# Patient Record
Sex: MALE | Race: WHITE | Employment: FULL TIME | URBAN - METROPOLITAN AREA
[De-identification: names, ages, dates, MRNs, and addresses within clinical notes are randomized per-mention and may not be internally consistent; named-entity substitution may affect disease eponyms.]

---

## 2019-11-13 ENCOUNTER — OFFICE VISIT (OUTPATIENT)
Dept: FAMILY MEDICINE | Facility: CLINIC | Age: 24
End: 2019-11-13
Payer: COMMERCIAL

## 2019-11-13 VITALS
HEIGHT: 71 IN | HEART RATE: 49 BPM | DIASTOLIC BLOOD PRESSURE: 89 MMHG | TEMPERATURE: 97.6 F | WEIGHT: 197 LBS | SYSTOLIC BLOOD PRESSURE: 136 MMHG | OXYGEN SATURATION: 98 % | BODY MASS INDEX: 27.58 KG/M2

## 2019-11-13 DIAGNOSIS — M54.50 ACUTE BILATERAL LOW BACK PAIN WITHOUT SCIATICA: Primary | ICD-10-CM

## 2019-11-13 DIAGNOSIS — M62.830 MUSCLE SPASM OF BACK: ICD-10-CM

## 2019-11-13 PROCEDURE — 99213 OFFICE O/P EST LOW 20 MIN: CPT | Performed by: INTERNAL MEDICINE

## 2019-11-13 RX ORDER — CYCLOBENZAPRINE HCL 5 MG
5 TABLET ORAL 2 TIMES DAILY PRN
Qty: 30 TABLET | Refills: 2 | Status: SHIPPED | OUTPATIENT
Start: 2019-11-13

## 2019-11-13 RX ORDER — LIDOCAINE 4 G/G
1 PATCH TOPICAL EVERY 24 HOURS
Qty: 30 PATCH | Refills: 3 | Status: SHIPPED | OUTPATIENT
Start: 2019-11-13

## 2019-11-13 ASSESSMENT — MIFFLIN-ST. JEOR: SCORE: 1904.37

## 2019-11-13 NOTE — PROGRESS NOTES
Micha Sanabria is a 23 year old male who presents to clinic today for the following health issues:    Back Pain       Duration: Since yesterday        Specific cause: turning/bending    Description:   Location of pain: low back bilateral  Character of pain: gnawing  Pain radiation:none  New numbness or weakness in legs, not attributed to pain:  no     Intensity: Currently 7-8/10    History:   Pain interferes with job: YES  History of back problems: no prior back problems  Any previous MRI or X-rays: None  Sees a specialist for back pain:  No  Therapies tried without relief: NSAIDs    Alleviating factors:   Improved by: None      Precipitating factors:  Worsened by: Walking          Current Medications:     Current Outpatient Medications   Medication Sig Dispense Refill     cyclobenzaprine (FLEXERIL) 5 MG tablet Take 1 tablet (5 mg) by mouth 2 times daily as needed for muscle spasms 30 tablet 2     Lidocaine (LIDOCARE) 4 % Patch Place 1 patch onto the skin every 24 hours 30 patch 3     order for DME Lumbar support brace 1 Units 3         Allergies:      Allergies   Allergen Reactions     Shellfish-Derived Products             Past Medical History:   No past medical history on file.      Past Surgical History:   No past surgical history on file.      Family Medical History:   No family history on file.      Social History:     Social History     Socioeconomic History     Marital status: Single     Spouse name: Not on file     Number of children: Not on file     Years of education: Not on file     Highest education level: Not on file   Occupational History     Not on file   Social Needs     Financial resource strain: Not on file     Food insecurity:     Worry: Not on file     Inability: Not on file     Transportation needs:     Medical: Not on file     Non-medical: Not on file   Tobacco Use     Smoking status: Never Smoker     Smokeless tobacco: Never Used   Substance and Sexual Activity     Alcohol use: Yes      "Comment: 4-5 drinks a week     Drug use: Never     Sexual activity: Yes     Partners: Female   Lifestyle     Physical activity:     Days per week: Not on file     Minutes per session: Not on file     Stress: Not on file   Relationships     Social connections:     Talks on phone: Not on file     Gets together: Not on file     Attends [employer] witness service: Not on file     Active member of club or organization: Not on file     Attends meetings of clubs or organizations: Not on file     Relationship status: Not on file     Intimate partner violence:     Fear of current or ex partner: Not on file     Emotionally abused: Not on file     Physically abused: Not on file     Forced sexual activity: Not on file   Other Topics Concern     Not on file   Social History Narrative     Not on file           Review of System:     Constitutional: Negative for fever or chills  Skin: Negative for rashes  Ears/Nose/Throat: Negative for nasal congestion, sore throat  Respiratory: No shortness of breath, dyspnea on exertion, cough, or hemoptysis  Cardiovascular: Negative for chest pain  Gastrointestinal: Negative for nausea, vomiting  Genitourinary: Negative for dysuria, hematuria  Musculoskeletal: positive for mechanical midline low back pains noted with muscle spasms symptoms  Neurologic: Negative for headaches  Psychiatric: Negative for depression, anxiety  Hematologic/Lymphatic/Immunologic: Negative  Endocrine: Negative  Behavioral: Negative for tobacco use       Physical Exam:   /89 (BP Location: Left arm, Patient Position: Sitting, Cuff Size: Adult Large)   Pulse (!) 49   Temp 97.6  F (36.4  C) (Oral)   Ht 1.793 m (5' 10.6\")   Wt 89.4 kg (197 lb)   SpO2 98%   BMI 27.79 kg/m      GENERAL: alert and no distress  EYES: eyes grossly normal to inspection, and conjunctivae and sclerae normal  HENT: Normocephalic atraumatic. Nose and mouth without ulcers or lesions  NECK: supple  RESP: lungs clear to auscultation   CV: regular rate " and rhythm, normal S1 S2  LYMPH: no peripheral edema   ABDOMEN: nondistended  MS: mechanical midline low back pains noted with muscle spasms symptoms present  SKIN: no suspicious lesions or rashes  NEURO: Alert & Oriented x 3.   PSYCH: mentation appears normal, affect normal        Diagnostic Test Results:     Diagnostic Test Results:  No results found for any visits on 11/13/19.    ASSESSMENT/PLAN:     (M62.830) Muscle spasm of back  (M54.5) Acute bilateral low back pain without sciatica  (primary encounter diagnosis)  Comment: acute low back pains with muscle spasms due to sports injury from playing tennis.  Plan: SPORTS MEDICINE REFERRAL, Lidocaine (LIDOCARE)         4 % Patch, cyclobenzaprine (FLEXERIL) 5 MG         tablet, order for DME      Follow Up Plan:     Patient is instructed to return to Internal Medicine clinic for follow-up visit in 1 month.        Inga Stokes MD  Internal Medicine  [facility]

## 2019-11-15 ENCOUNTER — OFFICE VISIT (OUTPATIENT)
Dept: ORTHOPEDICS | Facility: CLINIC | Age: 24
End: 2019-11-15
Payer: COMMERCIAL

## 2019-11-15 VITALS
WEIGHT: 197 LBS | SYSTOLIC BLOOD PRESSURE: 136 MMHG | BODY MASS INDEX: 27.58 KG/M2 | DIASTOLIC BLOOD PRESSURE: 86 MMHG | HEIGHT: 71 IN

## 2019-11-15 DIAGNOSIS — M54.50 ACUTE MIDLINE LOW BACK PAIN WITHOUT SCIATICA: Primary | ICD-10-CM

## 2019-11-15 PROCEDURE — 99204 OFFICE O/P NEW MOD 45 MIN: CPT | Performed by: FAMILY MEDICINE

## 2019-11-15 RX ORDER — DICLOFENAC SODIUM 75 MG/1
75 TABLET, DELAYED RELEASE ORAL 2 TIMES DAILY PRN
Qty: 60 TABLET | Refills: 0 | Status: SHIPPED | OUTPATIENT
Start: 2019-11-15

## 2019-11-15 ASSESSMENT — MIFFLIN-ST. JEOR: SCORE: 1904.37

## 2019-11-15 ASSESSMENT — ENCOUNTER SYMPTOMS: BACK PAIN: 1

## 2019-11-15 NOTE — LETTER
"    11/15/2019         RE: Micha Sanabria  [address]  [facility] [address]        Dear Colleague,    Thank you for referring your patient, Micha Sanabria, to the [facility]. Please see a copy of my visit note below.    HPI     [facility]   Clinic Visit s Nov 15, 2019    PCP: No Ref-Primary, Physician      Micha is a 23 year old male who is seen as self referral for   Chief Complaint   Patient presents with     Lower Back - Pain       Injury: Patient describes injury as playing tennis, his back tightened up. He noticed pain after sitting down for dinner.        Location of Pain: midline low back , nonradiating   Duration of Pain: acute, 3 day(s),   Rating of Pain at worst: 6/10  Rating of Pain Currently: 8/10  Pain is better with: laying flat   Pain is worse with:  bending forward, ADLs, sitting  Treatment so far consists of: back brace, ice, heat, cyclobenzaprine, ibuprofen   Associated symptoms: no distal numbness or tingling; denies swelling or warmth  Recent imaging completed: No recent imaging completed.  Prior History of related problems: no LBP issues in past    Social History: is employed as a/an [employer]      PMH, PSHX, FMHX, SOCHX all reviewed and are unremarkable or noncontributory to today's visit.  See intake form for details.      Review of Systems   Musculoskeletal: Positive for back pain.   All other systems reviewed and are negative.        Physical Exam    /86   Ht 1.793 m (5' 10.6\")   Wt 89.4 kg (197 lb)   BMI 27.79 kg/m     Constitutional:well-developed, well-nourished, and in no distress.   Cardiovascular: Intact distal pulses.    Neurological: alert. Gait shuffling, difficulty transitioning from sit to stand  Skin: Skin is warm and dry.   Psychiatric: Mood and affect normal.   Respiratory: unlabored, speaks in full sentences  Lymph: no LAD, no lymphangitis          Back Exam     Tenderness   The " patient is experiencing no tenderness.     Range of Motion   Extension: normal   Flexion: abnormal Back flexion: Limited to hands on knees.   Lateral bend right: normal   Lateral bend left: normal   Rotation right: normal   Rotation left: normal     Muscle Strength   The patient has normal back strength.    Tests   Straight leg raise right: negative  Straight leg raise left: negative    Other   Gait: normal   Erythema: no back redness  Scars: absent            ASSESSMENT/PLAN    ICD-10-CM    1. Acute midline low back pain without sciatica M54.5 [facility]RACT[facility] REFERRAL     diclofenac (VOLTAREN) 75 MG EC tablet     Reassurance,No radicular symptoms of numbness, tingling, weakness, or altered bowel/bladder control.    Nature of nonradicular low back pain discussed at length. Elaborated on role of activity rather than inactivity to minimize symptom duration. Short term pain relief symptoms discussed and side effects of various options reviewed. Emphasized long term management with strengthening and stabilization. Other options including chiropractic and physical therapy explored.    At this point, without acute sciatic symptoms or history of direct trauma, x-rays would cause unnecessary radiation and MRI would be under events.  However if symptoms fail to resolve or worsen over 2 to 4 weeks, this will be reevaluated.    Patient will switch to diclofenac from ibuprofen for improved pain control, and he  will go to acute spine rehab.    Again, thank you for allowing me to participate in the care of your patient.        Sincerely,        Homar Farrell MD

## 2019-11-15 NOTE — PROGRESS NOTES
"[facility]   Clinic Visit s Nov 15, 2019    PCP: No Ref-Primary, Physician      Micha is a 23 year old male who is seen as self referral for   Chief Complaint   Patient presents with     Lower Back - Pain       Injury: Patient describes injury as playing tennis, his back tightened up. He noticed pain after sitting down for dinner.        Location of Pain: midline low back , nonradiating   Duration of Pain: acute, 3 day(s),   Rating of Pain at worst: 6/10  Rating of Pain Currently: 8/10  Pain is better with: laying flat   Pain is worse with:  bending forward, ADLs, sitting  Treatment so far consists of: back brace, ice, heat, cyclobenzaprine, ibuprofen   Associated symptoms: no distal numbness or tingling; denies swelling or warmth  Recent imaging completed: No recent imaging completed.  Prior History of related problems: no LBP issues in past    Social History: is employed as a/an [employer]      PMH, PSHX, FMHX, SOCHX all reviewed and are unremarkable or noncontributory to today's visit.  See intake form for details.      Review of Systems   Musculoskeletal: Positive for back pain.   All other systems reviewed and are negative.        Physical Exam    /86   Ht 1.793 m (5' 10.6\")   Wt 89.4 kg (197 lb)   BMI 27.79 kg/m    Constitutional:well-developed, well-nourished, and in no distress.   Cardiovascular: Intact distal pulses.    Neurological: alert. Gait shuffling, difficulty transitioning from sit to stand  Skin: Skin is warm and dry.   Psychiatric: Mood and affect normal.   Respiratory: unlabored, speaks in full sentences  Lymph: no LAD, no lymphangitis          Back Exam     Tenderness   The patient is experiencing no tenderness.     Range of Motion   Extension: normal   Flexion: abnormal Back flexion: Limited to hands on knees.   Lateral bend right: normal   Lateral bend left: normal   Rotation right: normal   Rotation left: normal     Muscle Strength   The " patient has normal back strength.    Tests   Straight leg raise right: negative  Straight leg raise left: negative    Other   Gait: normal   Erythema: no back redness  Scars: absent            ASSESSMENT/PLAN    ICD-10-CM    1. Acute midline low back pain without sciatica M54.5 [facility], [facility] CHIROPRACTIC REFERRAL     diclofenac (VOLTAREN) 75 MG EC tablet     Reassurance,No radicular symptoms of numbness, tingling, weakness, or altered bowel/bladder control.    Nature of nonradicular low back pain discussed at length. Elaborated on role of activity rather than inactivity to minimize symptom duration. Short term pain relief symptoms discussed and side effects of various options reviewed. Emphasized long term management with strengthening and stabilization. Other options including chiropractic and physical therapy explored.    At this point, without acute sciatic symptoms or history of direct trauma, x-rays would cause unnecessary radiation and MRI would be under events.  However if symptoms fail to resolve or worsen over 2 to 4 weeks, this will be reevaluated.    Patient will switch to diclofenac from ibuprofen for improved pain control, and he  will go to acute spine rehab.